# Patient Record
Sex: MALE | Race: WHITE | NOT HISPANIC OR LATINO | Employment: FULL TIME | URBAN - METROPOLITAN AREA
[De-identification: names, ages, dates, MRNs, and addresses within clinical notes are randomized per-mention and may not be internally consistent; named-entity substitution may affect disease eponyms.]

---

## 2024-03-21 ENCOUNTER — HOSPITAL ENCOUNTER (EMERGENCY)
Facility: HOSPITAL | Age: 24
Discharge: HOME | End: 2024-03-21
Attending: EMERGENCY MEDICINE
Payer: COMMERCIAL

## 2024-03-21 VITALS
HEART RATE: 89 BPM | RESPIRATION RATE: 18 BRPM | BODY MASS INDEX: 27.47 KG/M2 | WEIGHT: 175 LBS | DIASTOLIC BLOOD PRESSURE: 75 MMHG | HEIGHT: 67 IN | SYSTOLIC BLOOD PRESSURE: 142 MMHG | OXYGEN SATURATION: 97 % | TEMPERATURE: 98.8 F

## 2024-03-21 DIAGNOSIS — N47.1 PHIMOSIS: ICD-10-CM

## 2024-03-21 DIAGNOSIS — N48.1 BALANITIS: Primary | ICD-10-CM

## 2024-03-21 LAB
BILIRUB UR QL STRIP: NEGATIVE
CLARITY UR: CLEAR
COLOR UR: YELLOW
GLUCOSE SERPL-MCNC: 106 MG/DL (ref 65–140)
GLUCOSE UR STRIP-MCNC: NEGATIVE MG/DL
HGB UR QL STRIP.AUTO: NEGATIVE
KETONES UR STRIP-MCNC: NEGATIVE MG/DL
LEUKOCYTE ESTERASE UR QL STRIP: NEGATIVE
NITRITE UR QL STRIP: NEGATIVE
PH UR STRIP.AUTO: 7 [PH]
PROT UR STRIP-MCNC: NEGATIVE MG/DL
SP GR UR STRIP.AUTO: 1.02 (ref 1–1.03)
UROBILINOGEN UR QL STRIP.AUTO: 1 E.U./DL

## 2024-03-21 PROCEDURE — 81003 URINALYSIS AUTO W/O SCOPE: CPT | Performed by: EMERGENCY MEDICINE

## 2024-03-21 PROCEDURE — 87563 M. GENITALIUM AMP PROBE: CPT | Performed by: EMERGENCY MEDICINE

## 2024-03-21 PROCEDURE — 99283 EMERGENCY DEPT VISIT LOW MDM: CPT

## 2024-03-21 PROCEDURE — 87491 CHLMYD TRACH DNA AMP PROBE: CPT | Performed by: EMERGENCY MEDICINE

## 2024-03-21 PROCEDURE — 99284 EMERGENCY DEPT VISIT MOD MDM: CPT | Performed by: EMERGENCY MEDICINE

## 2024-03-21 PROCEDURE — 87591 N.GONORRHOEAE DNA AMP PROB: CPT | Performed by: EMERGENCY MEDICINE

## 2024-03-21 PROCEDURE — 82948 REAGENT STRIP/BLOOD GLUCOSE: CPT

## 2024-03-21 PROCEDURE — 87661 TRICHOMONAS VAGINALIS AMPLIF: CPT | Performed by: EMERGENCY MEDICINE

## 2024-03-21 RX ORDER — CLOTRIMAZOLE AND BETAMETHASONE DIPROPIONATE 10; .64 MG/G; MG/G
CREAM TOPICAL
Qty: 15 G | Refills: 0 | Status: SHIPPED | OUTPATIENT
Start: 2024-03-21

## 2024-03-21 RX ORDER — FLUCONAZOLE 150 MG/1
150 TABLET ORAL ONCE
Status: COMPLETED | OUTPATIENT
Start: 2024-03-21 | End: 2024-03-21

## 2024-03-21 RX ADMIN — FLUCONAZOLE 150 MG: 150 TABLET ORAL at 20:18

## 2024-03-22 LAB
C TRACH DNA SPEC QL NAA+PROBE: NEGATIVE
M GENITALIUM DNA SPEC QL NAA+PROBE: NEGATIVE
N GONORRHOEA DNA SPEC QL NAA+PROBE: NEGATIVE
T VAGINALIS DNA SPEC QL NAA+PROBE: NEGATIVE

## 2024-03-22 NOTE — ED PROVIDER NOTES
History  Chief Complaint   Patient presents with    Penis / Scrotum Problem     Patient states last night while he was showering he attempted to pull his foreskin back to clean his penis and his foreskin would not move. States it is very dry, immobile, and there is slight swelling to the glans. Told to come be seen by PCP. No pain, no difficulty urinating.      Patient presents for evaluation of inability to retract his foreskin.  Patient states last night while in the shower he attempted to retract his foreskin to wash himself and he could not get it to retract.  He also noticed some slight swelling.  Patient states he last retracted his foreskin 1 week ago.  Patient denies any possible exposure to STDs.  Patient is not having any difficulty urinating and no pain with urination.  He saw his doctor today who told him he needed to see urologist immediately and got nervous when he could not get into see the urologist so he came to the ER for evaluation.      History provided by:  Patient   used: No    Penis / Scrotum Problem      None       Past Medical History:   Diagnosis Date    Childhood asthma        Past Surgical History:   Procedure Laterality Date    WISDOM TOOTH EXTRACTION         History reviewed. No pertinent family history.  I have reviewed and agree with the history as documented.    E-Cigarette/Vaping    E-Cigarette Use Never User      E-Cigarette/Vaping Substances     Social History     Tobacco Use    Smoking status: Never    Smokeless tobacco: Never   Vaping Use    Vaping status: Never Used   Substance Use Topics    Alcohol use: Yes     Comment: very rare    Drug use: Yes     Types: Marijuana     Comment: very rare       Review of Systems   All other systems reviewed and are negative.      Physical Exam  Physical Exam  Vitals and nursing note reviewed.   Constitutional:       General: He is not in acute distress.  Genitourinary:     Penis: Uncircumcised.        Neurological:       General: No focal deficit present.      Mental Status: He is alert and oriented to person, place, and time.         Vital Signs  ED Triage Vitals [03/21/24 1931]   Temperature Pulse Respirations Blood Pressure SpO2   98.8 °F (37.1 °C) 89 18 142/75 97 %      Temp Source Heart Rate Source Patient Position - Orthostatic VS BP Location FiO2 (%)   Temporal Monitor Sitting Right arm --      Pain Score       No Pain           Vitals:    03/21/24 1931   BP: 142/75   Pulse: 89   Patient Position - Orthostatic VS: Sitting         Visual Acuity      ED Medications  Medications   fluconazole (DIFLUCAN) tablet 150 mg (150 mg Oral Given 3/21/24 2018)       Diagnostic Studies  Results Reviewed       Procedure Component Value Units Date/Time    UA (URINE) with reflex to Scope [[id number]] Collected: 03/21/24 2000    Lab Status: Final result Specimen: Urine, Clean Catch Updated: 03/21/24 2014     Color, UA Yellow     Clarity, UA Clear     Specific Gravity, UA 1.025     pH, UA 7.0     Leukocytes, UA Negative     Nitrite, UA Negative     Protein, UA Negative mg/dl      Glucose, UA Negative mg/dl      Ketones, UA Negative mg/dl      Urobilinogen, UA 1.0 E.U./dl      Bilirubin, UA Negative     Occult Blood, UA Negative    Trichomonas vaginalis/Mycoplasma genitalium PCR [[id number]] Collected: 03/21/24 2000    Lab Status: In process Specimen: Urine, Clean Catch Updated: 03/21/24 2009    Chlamydia/GC amplified DNA by PCR [[id number]] Collected: 03/21/24 2000    Lab Status: In process Specimen: Urine, Other Updated: 03/21/24 2009    Fingerstick Glucose (POCT) [[id number]]  (Normal) Collected: 03/21/24 1955    Lab Status: Final result Specimen: Blood Updated: 03/21/24 1956     POC Glucose 106 mg/dl                    No orders to display              Procedures  Procedures         ED Course                               SBIRT 22yo+      Flowsheet Row Most Recent Value   Initial Alcohol Screen: US AUDIT-C     1. How often do you have a drink  containing alcohol? 0 Filed at: 03/21/2024 1934   2. How many drinks containing alcohol do you have on a typical day you are drinking?  0 Filed at: 03/21/2024 1934   3a. Male UNDER 65: How often do you have five or more drinks on one occasion? 0 Filed at: 03/21/2024 1934   3b. FEMALE Any Age, or MALE 65+: How often do you have 4 or more drinks on one occassion? 0 Filed at: 03/21/2024 1934   Audit-C Score 0 Filed at: 03/21/2024 1934   KAMLA: How many times in the past year have you...    Used an illegal drug or used a prescription medication for non-medical reasons? Never Filed at: 03/21/2024 1934                      Medical Decision Making  Pulse ox 97% on room air indicating adequate oxygenation.        Likely plantar fasciitis causing swelling and inability to retract the foreskin.  Will obtain a urine to rule out STDs and infection.  Will check a fingerstick to rule out new onset diabetes.  Will treat with topical steroid and antifungal treatment and have patient follow-up with urology.  If he develops any difficulty urinating to return immediately to the ER for evaluation.    Amount and/or Complexity of Data Reviewed  Labs: ordered.    Risk  Prescription drug management.             Disposition  Final diagnoses:   Balanitis   Phimosis     Time reflects when diagnosis was documented in both MDM as applicable and the Disposition within this note       Time User Action Codes Description Comment    3/21/2024  8:27 PM Gerry Zhong Add [N48.1] Balanitis     3/21/2024  8:27 PM Gerry Zhong Add [N47.1] Phimosis           ED Disposition       ED Disposition   Discharge    Condition   Stable    Date/Time   Thu Mar 21, 2024 2027    Comment   Addy Portilloki discharge to home/self care.                   Follow-up Information       Follow up With Specialties Details Why Contact Info Additional Information    Cong Linton MD Urology In 1 week  [address] [place] [address] [id number] [phone]       [facility]  [facility] Emergency Department Emergency Medicine  If symptoms worsen [address] [facility] [address]  [phone] [facility] Emergency Department, [address] [place], [place], [address]            Discharge Medication List as of 3/21/2024  8:29 PM        START taking these medications    Details   clotrimazole-betamethasone (LOTRISONE) 1-0.05 % cream Apply to affected area 2 times daily prn, Normal             No discharge procedures on file.    PDMP Review       None            ED Provider  Electronically Signed by             Gerry Zhong DO  03/21/24 7157